# Patient Record
Sex: MALE | Race: WHITE | HISPANIC OR LATINO | Employment: OTHER | URBAN - METROPOLITAN AREA
[De-identification: names, ages, dates, MRNs, and addresses within clinical notes are randomized per-mention and may not be internally consistent; named-entity substitution may affect disease eponyms.]

---

## 2018-01-11 NOTE — PROGRESS NOTES
Assessment    1  Encounter for preventive health examination (V70 0) (Z00 00)   2  Encounter for prostate cancer screening (V76 44) (Z12 5)   3  Overweight (278 02) (E66 3)   4  IFG (impaired fasting glucose) (790 21) (R73 01)   5  Left knee pain (719 46) (M25 562)    Plan  Health Maintenance, Encounter for prostate cancer screening, IFG (impaired fasting  glucose), Overweight    · (1) PSA (SCREEN) (Dx V76 44 Screen for Prostate Cancer); Status:Active; Requested  IAR:[id number];    · (1) CBC/PLT/DIFF; Status:Active; Requested PNA:[id number]; Health Maintenance, IFG (impaired fasting glucose), Overweight    · (1) BASIC METABOLIC PROFILE; Status:Active; Requested GTQ:[id number];    · (1) HEMOGLOBIN A1C; Status:Active; Requested KZF:[id number];    · (1) LIPID PANEL FASTING W DIRECT LDL REFLEX; Status:Active; Requested  SJN:97MMU7752;   Left knee pain    · Meloxicam 7 5 MG Oral Tablet; TAKE 1 TABLET 1-2 times DAILY AS NEEDED for  knee pain   · * XR KNEE 4+ VIEW LEFT; Status:Active; Requested LII:[id number];   Need for Tdap vaccination    · Adacel 5-2-15 5 LF-MCG/0 5 Intramuscular Suspension    Discussion/Summary  Impression: health maintenance visit  Colorectal cancer screening: colorectal cancer screening is current  The immunizations are up to date and immunizations will be given as outlined in the orders  Advice and education were given regarding nutrition and weight loss  Patient discussion: discussed with the patient  RTO in 6 months  Possible side effects of new medications were reviewed with the patient/guardian today  The patient was counseled regarding instructions for management, risk factor reductions, impressions  total time of encounter was 30 minutes       Self Referrals: No      Chief Complaint  Patient here for a  and complains of left leg and left knee pain for over a year  negative for depression   needs Adacel will get today and Zostavax will give info  UTD colonoscopy will get report from  [facility]      History of Present Illness  , Adult Male: The patient is being seen for a health maintenance evaluation  The last health maintenance visit was 2 year(s) ago  General Health: The patient's health since the last visit is described as good  Lifestyle:  He does not have a healthy diet  He has weight concerns  Weight control issues: overweight  He does not exercise regularly  He does not use tobacco  He denies alcohol use  He denies drug use  Screening:   HPI: left knee pain when he walks up the steps  At home he has stairs  walking is fine  Pain 5/10  No locking, no injury  He recently retired  Active Problems    1  Overweight (278 02) (E66 3)   2  Vitamin D deficiency (268 9) (E55 9)    Past Medical History    · History of Colon cancer (153 9) (C18 9)    Surgical History    · History of Partial Colectomy    Family History  Mother    · Family history of Diabetes  Father    · Family history of Alzheimer disease  Brother    · Family history of Thyroid disease    Social History    · Lives with relatives   · Never a smoker   · Occasional alcohol use   · Single    Current Meds   1  Calcium 600 MG Oral Tablet; Therapy: 50VMP9376 to Recorded   2  Fish Oil 1000 MG Oral Capsule; Therapy: 86DAD0835 to Recorded   3  Multi-Vitamins Oral Tablet; Therapy: 18TMD3451 to Recorded   4  Vitamin D 2000 UNIT Oral Capsule; Therapy: 30CSI3038 to Recorded    Allergies    1  No Known Drug Allergies    Vitals   Recorded: 21Jun2016 09:19AM   Temperature 98 2 F   Heart Rate 68   Systolic 207   Diastolic 82   Height 5 ft 2 5 in   Weight 205 lb 8 0 oz   BMI Calculated 36 99   BSA Calculated 1 95   O2 Saturation 98   Pain Scale 5     Physical Exam    Constitutional   General appearance: No acute distress, well appearing and well nourished  Head and Face   Head and face: Normal     Eyes   Conjunctiva and lids: No erythema, swelling or discharge  Pupils and irises: Equal, round, reactive to light  Ears, Nose, Mouth, and Throat   External inspection of ears and nose: Normal     Otoscopic examination: Tympanic membranes translucent with normal light reflex  Canals patent without erythema  Lips, teeth, and gums: Normal, good dentition  Oropharynx: Normal with no erythema, edema, exudate or lesions  Neck   Neck: Supple, symmetric, trachea midline, no masses  Thyroid: Normal, no thyromegaly  Pulmonary   Respiratory effort: No increased work of breathing or signs of respiratory distress  Auscultation of lungs: Clear to auscultation  Cardiovascular   Auscultation of heart: Normal rate and rhythm, normal S1 and S2, no murmurs  Peripheral vascular exam: Normal     Examination of extremities for edema and/or varicosities: Normal     Abdomen   Abdomen: Non-tender, no masses  Lymphatic   Palpation of lymph nodes in neck: No lymphadenopathy  Musculoskeletal   Gait and station: Normal     Inspection/palpation of joints, bones, and muscles: Normal     Neurologic   Cranial nerves: Cranial nerves 2-12 intact  Psychiatric   Mood and affect: Normal        Results/Data  PHQ-2 Adult Depression Screening 21Jun2016 09:22AM User, s     Test Name Result Flag Reference   PHQ-2 Adult Depression Score 0     Over the last two weeks, how often have you been bothered by any of the following problems? Little interest or pleasure in doing things: Not at all - 0  Feeling down, depressed, or hopeless: Not at all - 0   PHQ-2 Adult Depression Screening Negative         Future Appointments    Date/Time Provider Specialty Site   12/22/2016 10:00 AM SHAHBAZ De Leon   Family Medicine [address]     Signatures   Electronically signed by : SHAHBAZ Worrell ; Jun 21 2016 12:35PM EST                       (Author)

## 2018-01-12 NOTE — RESULT NOTES
Message      please notify pt his labs are stable  He does have prediabetes so he needs to be careful with carbohydrates and that includes bread, rice, juices, pasta  thanks     Verified Results  (1) HEMOGLOBIN A1C 48BQR8280 07:28AM Lauren Corona Order Number: [id number]  TW Order Number: [id number]     Test Name Result Flag Reference   HEMOGLOBIN A1C 5 8 %  4 2-6 3   EST  AVG  GLUCOSE 120 mg/dl       (1) LIPID PANEL FASTING W DIRECT LDL REFLEX 22Jun2016 07:28AM Lauren Corona Order Number: [id number]  TW Order Number: [id number] Order Number: [id number]     Test Name Result Flag Reference   CHOLESTEROL 179 mg/dL     LDL CHOLESTEROL CALCULATED 97 mg/dL  0-100   Triglyceride:         Normal              <150 mg/dl       Borderline High    150-199 mg/dl       High               200-499 mg/dl       Very High          >499 mg/dl  Cholesterol:         Desirable        <200 mg/dl      Borderline High  200-239 mg/dl      High             >239 mg/dl  HDL Cholesterol:        High    >59 mg/dL      Low     <41 mg/dL  LDL Cholesterol:        Optimal          <100 mg/dl        Near Optimal     100-129 mg/dl        Above Optimal          Borderline High   130-159 mg/dl          High              160-189 mg/dl          Very High        >189 mg/dl  LDL CALCULATED:    This screening LDL is a calculated result  It does not have the accuracy of the Direct Measured LDL in the monitoring of patients with hyperlipidemia and/or statin therapy  Direct Measure LDL (POL626) must be ordered separately in these patients  TRIGLYCERIDES 111 mg/dL  <=150   Specimen collection should occur prior to N-Acetylcysteine or Metamizole administration due to the potential for falsely depressed results  HDL,DIRECT 60 mg/dL  40-60   Specimen collection should occur prior to Metamizole administration due to the potential for falsely depressed results       (1) BASIC METABOLIC PROFILE 14HMZ8022 07:28AM Evangelina Holloway Order Number: [id number]  TW Order Number: [id number] Order Number: [id number]     Test Name Result Flag Reference   GLUCOSE,RANDM 101 mg/dL     If the patient is fasting, the ADA then defines impaired fasting glucose as > 100 mg/dL and diabetes as > or equal to 123 mg/dL  SODIUM 138 mmol/L  136-145   POTASSIUM 3 9 mmol/L  3 5-5 3   CHLORIDE 104 mmol/L  100-108   CARBON DIOXIDE 29 mmol/L  21-32   ANION GAP (CALC) 5 mmol/L  4-13   BLOOD UREA NITROGEN 12 mg/dL  5-25   CREATININE 0 87 mg/dL  0 60-1 30   Standardized to IDMS reference method   CALCIUM 8 7 mg/dL  8 3-10 1   eGFR Non-African American      >60 0 ml/min/1 73[facility] Disease Education Program recommendations are as follows:  GFR calculation is accurate only with a steady state creatinine  Chronic Kidney disease less than 60 ml/min/1 73 sq  meters  Kidney failure less than 15 ml/min/1 73 sq  meters       (1) PSA (SCREEN) (Dx V76 44 Screen for Prostate Cancer) 21XGH1976 07:28AM Evangelina Holloway Order Number: [id number]  TW Order Number: [id number]     Test Name Result Flag Reference   PROSTATE SPECIFIC ANTIGEN 0 9 ng/mL  0 0-4 0     (1) CBC/PLT/DIFF 90Oen2780 07:28AM Evangelina Holloway Order Number: [id number]  TW Order Number: [id number]     Test Name Result Flag Reference   WBC COUNT 6 17 Thousand/uL  4 31-10 16   RBC COUNT 4 64 Million/uL  3 88-5 62   HEMOGLOBIN 15 1 g/dL  12 0-17 0   HEMATOCRIT 44 0 %  36 5-49 3   MCV 95 fL  82-98   MCH 32 5 pg  26 8-34 3   MCHC 34 3 g/dL  31 4-37 4   RDW 13 2 %  11 6-15 1   MPV 10 9 fL  8 9-12 7   PLATELET COUNT 216 Thousands/uL  149-390   nRBC AUTOMATED 0 /100 WBCs     NEUTROPHILS RELATIVE PERCENT 45 %  43-75   LYMPHOCYTES RELATIVE PERCENT 41 %  14-44   MONOCYTES RELATIVE PERCENT 8 %  4-12   EOSINOPHILS RELATIVE PERCENT 5 %  0-6   BASOPHILS RELATIVE PERCENT 1 %  0-1   NEUTROPHILS ABSOLUTE COUNT 2 75 Thousands/?L  1 85-7 62   LYMPHOCYTES ABSOLUTE COUNT 2 55 Thousands/?L  0 60-4 47   MONOCYTES ABSOLUTE COUNT 0 52 Thousand/?L  0 17-1 22   EOSINOPHILS ABSOLUTE COUNT 0 30 Thousand/?L  0 00-0 61   BASOPHILS ABSOLUTE COUNT 0 04 Thousands/?L  0 00-0 10     * XR KNEE 3 VIEW LEFT 21Jun2016 10:38AM Miguel Ángel Platt Order Number: [id number]     Test Name Result Flag Reference   XR KNEE 3 VW LEFT (Report)     LEFT KNEE     INDICATION: Left knee pain for over one year  No trauma     COMPARISON: None     VIEWS: 3; 3 images     FINDINGS:     There is no acute fracture or dislocation  There is no joint effusion  Minimal superior patellar spurring  No lytic or blastic lesions are seen  Soft tissues are unremarkable  IMPRESSION:     No acute osseous abnormality         Workstation performed: [id number]     Signed by:   Lissy Shah MD   6/22/16       Signatures   Electronically signed by : SHAHBAZ Daniel ; Jun 28 2016  8:52AM EST                       (Author)

## 2018-04-06 ENCOUNTER — HOSPITAL ENCOUNTER (EMERGENCY)
Facility: HOSPITAL | Age: 63
Discharge: HOME | End: 2018-04-06

## 2018-04-06 VITALS
SYSTOLIC BLOOD PRESSURE: 133 MMHG | WEIGHT: 210 LBS | RESPIRATION RATE: 18 BRPM | DIASTOLIC BLOOD PRESSURE: 67 MMHG | TEMPERATURE: 97.9 F | HEIGHT: 64 IN | OXYGEN SATURATION: 97 % | HEART RATE: 74 BPM | BODY MASS INDEX: 35.85 KG/M2

## 2018-04-06 DIAGNOSIS — T44.5X1A ACCIDENTAL INJECTION OF EPINEPHRINE, INITIAL ENCOUNTER: Primary | ICD-10-CM

## 2018-04-06 PROCEDURE — 99282 EMERGENCY DEPT VISIT SF MDM: CPT

## 2018-04-06 NOTE — ED PROVIDER NOTES
History  Chief Complaint   Patient presents with    Infectious Exposure - [place]     Patient was stuck by his grandson's new epi pen on his right index finger  57year old male with no past medical history presents today after being stuck in his right 2nd digit with his grandson's (pediatric) epi pen  Believes that a full dose of medication was injected  Incident occurred 2 hours prior to arrival  Pt called the  who told him to come to the emergency department  Pt denies any complaints at this time  Reports normal sensation and full range of motion of the digit  Denies headache, lightheadedness, chest pain, shortness of breath, abdominal pain, nausea, vomiting, diarrhea  None       History reviewed  No pertinent past medical history  Past Surgical History:   Procedure Laterality Date    COLON SURGERY         History reviewed  No pertinent family history  I have reviewed and agree with the history as documented  Social History   Substance Use Topics    Smoking status: Never Smoker    Smokeless tobacco: Never Used    Alcohol use Yes        Review of Systems   Skin: Positive for pallor  All other systems reviewed and are negative  Physical Exam  ED Triage Vitals [04/06/18 0937]   Temperature Pulse Respirations Blood Pressure SpO2   97 9 °F (36 6 °C) 74 18 133/67 97 %      Temp Source Heart Rate Source Patient Position - Orthostatic VS BP Location FiO2 (%)   Oral Monitor Sitting Left arm --      Pain Score       No Pain           Orthostatic Vital Signs  Vitals:    04/06/18 0937   BP: 133/67   Pulse: 74   Patient Position - Orthostatic VS: Sitting       Physical Exam   Constitutional: He appears well-developed and well-nourished  No distress  HENT:   Head: Normocephalic and atraumatic  Mouth/Throat: Oropharynx is clear and moist    Eyes: Conjunctivae are normal    Neck: Normal range of motion  Cardiovascular: Normal rate, regular rhythm and normal heart sounds  Pulmonary/Chest: Effort normal and breath sounds normal  No respiratory distress  He has no wheezes  Abdominal: Soft  He exhibits no distension  There is no tenderness  Musculoskeletal: Normal range of motion  Neurological: He is alert  No sensory deficit  Skin: Skin is warm and dry  He is not diaphoretic  There is pallor (right distal 2nd digit  Warm, sensation intact  No cyanosis  )  Psychiatric: He has a normal mood and affect  His behavior is normal        ED Medications  Medications - No data to display    Diagnostic Studies  Results Reviewed     None                 No orders to display              Procedures  Procedures       Phone Contacts  ED Phone Contact    ED Course  ED Course                                MDM  Number of Diagnoses or Management Options  Accidental injection of epinephrine, initial encounter:   Diagnosis management comments: Pt's finger starting to pink up after 1 hr observation  Finger is warm and does not appear dusky  Sensation is intact  Return precautions reviewed with patient and family member  CritCare Time    Disposition  Final diagnoses:   Accidental injection of epinephrine, initial encounter     Time reflects when diagnosis was documented in both MDM as applicable and the Disposition within this note     Time User Action Codes Description Comment    4/6/2018 11:05 AM Young, [address] [facility] Accidental injection of epinephrine, initial encounter       ED Disposition     ED Disposition Condition Comment    Discharge  Noel Mcdowell discharge to home/self care  Condition at discharge: Good        Follow-up Information     Follow up With Specialties Details Why Sivakumar Morrison MD Family Medicine Schedule an appointment as soon as possible for a visit  [address]  [address] [address] [place] [address]  Lucero Mitchell [address] [place] [address]  [phone]          There are no discharge medications for this patient  No discharge procedures on file      ED Provider  Electronically Signed by           Estrella Candelaria PA-C  04/06/18 1151

## 2019-03-05 ENCOUNTER — LAB REQUISITION (OUTPATIENT)
Dept: LAB | Facility: HOSPITAL | Age: 64
End: 2019-03-05
Payer: MEDICARE

## 2019-03-05 DIAGNOSIS — Z85.038 PERSONAL HISTORY OF OTHER MALIGNANT NEOPLASM OF LARGE INTESTINE: ICD-10-CM

## 2019-03-05 PROCEDURE — 88305 TISSUE EXAM BY PATHOLOGIST: CPT | Performed by: PATHOLOGY
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
## 2019-07-05 ENCOUNTER — OFFICE VISIT (OUTPATIENT)
Dept: FAMILY MEDICINE CLINIC | Facility: CLINIC | Age: 64
End: 2019-07-05
Payer: MEDICARE

## 2019-07-05 VITALS
OXYGEN SATURATION: 96 % | WEIGHT: 199.2 LBS | HEIGHT: 62 IN | RESPIRATION RATE: 18 BRPM | DIASTOLIC BLOOD PRESSURE: 80 MMHG | BODY MASS INDEX: 36.66 KG/M2 | TEMPERATURE: 98.3 F | HEART RATE: 76 BPM | SYSTOLIC BLOOD PRESSURE: 120 MMHG

## 2019-07-05 DIAGNOSIS — Z77.29 CARBON MONOXIDE EXPOSURE: Primary | ICD-10-CM

## 2019-07-05 DIAGNOSIS — Z00.00 ENCOUNTER FOR HEALTH MAINTENANCE EXAMINATION IN ADULT: ICD-10-CM

## 2019-07-05 DIAGNOSIS — Z23 NEED FOR VACCINATION AGAINST STREPTOCOCCUS PNEUMONIAE USING PNEUMOCOCCAL CONJUGATE VACCINE 13: ICD-10-CM

## 2019-07-05 PROCEDURE — 99204 OFFICE O/P NEW MOD 45 MIN: CPT | Performed by: FAMILY MEDICINE

## 2019-07-05 PROCEDURE — 90670 PCV13 VACCINE IM: CPT

## 2019-07-05 PROCEDURE — G0009 ADMIN PNEUMOCOCCAL VACCINE: HCPCS

## 2019-07-05 NOTE — ASSESSMENT & PLAN NOTE
Labwork ordered to assess    If labwork is normal, will plan to follow-up in one year for an annual physical

## 2019-07-05 NOTE — PATIENT INSTRUCTIONS

## 2019-07-06 ENCOUNTER — APPOINTMENT (OUTPATIENT)
Dept: LAB | Facility: HOSPITAL | Age: 64
End: 2019-07-06
Payer: MEDICARE

## 2019-07-06 DIAGNOSIS — Z00.00 ENCOUNTER FOR HEALTH MAINTENANCE EXAMINATION IN ADULT: ICD-10-CM

## 2019-07-06 LAB
ALBUMIN SERPL BCP-MCNC: 4.2 G/DL (ref 3.5–5)
ALP SERPL-CCNC: 64 U/L (ref 46–116)
ALT SERPL W P-5'-P-CCNC: 18 U/L (ref 12–78)
ANION GAP SERPL CALCULATED.3IONS-SCNC: 8 MMOL/L (ref 4–13)
AST SERPL W P-5'-P-CCNC: 24 U/L (ref 5–45)
BASOPHILS # BLD AUTO: 0.06 THOUSANDS/ΜL (ref 0–0.1)
BASOPHILS NFR BLD AUTO: 1 % (ref 0–1)
BILIRUB SERPL-MCNC: 1.24 MG/DL (ref 0.2–1)
BUN SERPL-MCNC: 14 MG/DL (ref 5–25)
CALCIUM SERPL-MCNC: 9 MG/DL (ref 8.3–10.1)
CHLORIDE SERPL-SCNC: 106 MMOL/L (ref 100–108)
CHOLEST SERPL-MCNC: 176 MG/DL (ref 50–200)
CO2 SERPL-SCNC: 27 MMOL/L (ref 21–32)
CREAT SERPL-MCNC: 0.88 MG/DL (ref 0.6–1.3)
EOSINOPHIL # BLD AUTO: 0.21 THOUSAND/ΜL (ref 0–0.61)
EOSINOPHIL NFR BLD AUTO: 2 % (ref 0–6)
ERYTHROCYTE [DISTWIDTH] IN BLOOD BY AUTOMATED COUNT: 13.3 % (ref 11.6–15.1)
EST. AVERAGE GLUCOSE BLD GHB EST-MCNC: 111 MG/DL
GFR SERPL CREATININE-BSD FRML MDRD: 90 ML/MIN/1.73SQ M
GLUCOSE P FAST SERPL-MCNC: 76 MG/DL (ref 65–99)
HBA1C MFR BLD: 5.5 % (ref 4.2–6.3)
HCT VFR BLD AUTO: 45.4 % (ref 36.5–49.3)
HCV AB SER QL: NORMAL
HDLC SERPL-MCNC: 62 MG/DL (ref 40–60)
HGB BLD-MCNC: 15.2 G/DL (ref 12–17)
IMM GRANULOCYTES # BLD AUTO: 0.02 THOUSAND/UL (ref 0–0.2)
IMM GRANULOCYTES NFR BLD AUTO: 0 % (ref 0–2)
LDLC SERPL CALC-MCNC: 100 MG/DL (ref 0–100)
LYMPHOCYTES # BLD AUTO: 2.43 THOUSANDS/ΜL (ref 0.6–4.47)
LYMPHOCYTES NFR BLD AUTO: 28 % (ref 14–44)
MCH RBC QN AUTO: 32.3 PG (ref 26.8–34.3)
MCHC RBC AUTO-ENTMCNC: 33.5 G/DL (ref 31.4–37.4)
MCV RBC AUTO: 96 FL (ref 82–98)
MONOCYTES # BLD AUTO: 0.64 THOUSAND/ΜL (ref 0.17–1.22)
MONOCYTES NFR BLD AUTO: 7 % (ref 4–12)
NEUTROPHILS # BLD AUTO: 5.48 THOUSANDS/ΜL (ref 1.85–7.62)
NEUTS SEG NFR BLD AUTO: 62 % (ref 43–75)
NRBC BLD AUTO-RTO: 0 /100 WBCS
PLATELET # BLD AUTO: 222 THOUSANDS/UL (ref 149–390)
PMV BLD AUTO: 11.2 FL (ref 8.9–12.7)
POTASSIUM SERPL-SCNC: 3.9 MMOL/L (ref 3.5–5.3)
PROT SERPL-MCNC: 8 G/DL (ref 6.4–8.2)
RBC # BLD AUTO: 4.71 MILLION/UL (ref 3.88–5.62)
SODIUM SERPL-SCNC: 141 MMOL/L (ref 136–145)
TRIGL SERPL-MCNC: 71 MG/DL
TSH SERPL DL<=0.05 MIU/L-ACNC: 1.88 UIU/ML (ref 0.36–3.74)
WBC # BLD AUTO: 8.84 THOUSAND/UL (ref 4.31–10.16)

## 2019-07-06 PROCEDURE — 84443 ASSAY THYROID STIM HORMONE: CPT

## 2019-07-06 PROCEDURE — 86803 HEPATITIS C AB TEST: CPT

## 2019-07-06 PROCEDURE — 36415 COLL VENOUS BLD VENIPUNCTURE: CPT

## 2019-07-06 PROCEDURE — 83036 HEMOGLOBIN GLYCOSYLATED A1C: CPT

## 2019-07-06 PROCEDURE — G0103 PSA SCREENING: HCPCS

## 2019-07-06 PROCEDURE — 80053 COMPREHEN METABOLIC PANEL: CPT

## 2019-07-06 PROCEDURE — 80061 LIPID PANEL: CPT

## 2019-07-06 PROCEDURE — 85025 COMPLETE CBC W/AUTO DIFF WBC: CPT

## 2019-07-09 LAB
PSA FREE MFR SERPL: 12.5 %
PSA FREE SERPL-MCNC: 0.1 NG/ML
PSA SERPL-MCNC: 0.8 NG/ML (ref 0–4)

## 2019-07-11 ENCOUNTER — TELEPHONE (OUTPATIENT)
Dept: FAMILY MEDICINE CLINIC | Facility: CLINIC | Age: 64
End: 2019-07-11

## 2019-07-11 PROBLEM — Z77.29 CARBON MONOXIDE EXPOSURE: Status: ACTIVE | Noted: 2019-07-11

## 2019-07-11 NOTE — ASSESSMENT & PLAN NOTE
Patient has oxygen saturation of 96% with normal lung sounds and no cyanosis on exam   No further treatment indicated at this time

## 2019-09-30 ENCOUNTER — TELEPHONE (OUTPATIENT)
Dept: FAMILY MEDICINE CLINIC | Facility: CLINIC | Age: 64
End: 2019-09-30

## 2020-08-04 ENCOUNTER — OFFICE VISIT (OUTPATIENT)
Dept: FAMILY MEDICINE CLINIC | Facility: CLINIC | Age: 65
End: 2020-08-04
Payer: MEDICARE

## 2020-08-04 VITALS
TEMPERATURE: 98.1 F | DIASTOLIC BLOOD PRESSURE: 60 MMHG | RESPIRATION RATE: 17 BRPM | HEIGHT: 64 IN | OXYGEN SATURATION: 98 % | WEIGHT: 224 LBS | HEART RATE: 72 BPM | SYSTOLIC BLOOD PRESSURE: 128 MMHG | BODY MASS INDEX: 38.24 KG/M2

## 2020-08-04 DIAGNOSIS — Z23 NEED FOR PROPHYLACTIC VACCINATION AGAINST STREPTOCOCCUS PNEUMONIAE (PNEUMOCOCCUS): ICD-10-CM

## 2020-08-04 DIAGNOSIS — Z00.00 WELCOME TO MEDICARE PREVENTIVE VISIT: Primary | ICD-10-CM

## 2020-08-04 DIAGNOSIS — Z13.21 SCREENING FOR ENDOCRINE, NUTRITIONAL, METABOLIC AND IMMUNITY DISORDER: ICD-10-CM

## 2020-08-04 DIAGNOSIS — Z13.29 SCREENING FOR ENDOCRINE, NUTRITIONAL, METABOLIC AND IMMUNITY DISORDER: ICD-10-CM

## 2020-08-04 DIAGNOSIS — Z13.228 SCREENING FOR ENDOCRINE, NUTRITIONAL, METABOLIC AND IMMUNITY DISORDER: ICD-10-CM

## 2020-08-04 DIAGNOSIS — H54.61 VISION LOSS OF RIGHT EYE: ICD-10-CM

## 2020-08-04 DIAGNOSIS — Z13.0 SCREENING FOR ENDOCRINE, NUTRITIONAL, METABOLIC AND IMMUNITY DISORDER: ICD-10-CM

## 2020-08-04 PROCEDURE — 1170F FXNL STATUS ASSESSED: CPT | Performed by: FAMILY MEDICINE

## 2020-08-04 PROCEDURE — 90732 PPSV23 VACC 2 YRS+ SUBQ/IM: CPT | Performed by: FAMILY MEDICINE

## 2020-08-04 PROCEDURE — G0009 ADMIN PNEUMOCOCCAL VACCINE: HCPCS | Performed by: FAMILY MEDICINE

## 2020-08-04 PROCEDURE — 1123F ACP DISCUSS/DSCN MKR DOCD: CPT | Performed by: FAMILY MEDICINE

## 2020-08-04 PROCEDURE — 3008F BODY MASS INDEX DOCD: CPT | Performed by: FAMILY MEDICINE

## 2020-08-04 PROCEDURE — 4040F PNEUMOC VAC/ADMIN/RCVD: CPT | Performed by: FAMILY MEDICINE

## 2020-08-04 PROCEDURE — 1160F RVW MEDS BY RX/DR IN RCRD: CPT | Performed by: FAMILY MEDICINE

## 2020-08-04 PROCEDURE — 1125F AMNT PAIN NOTED PAIN PRSNT: CPT | Performed by: FAMILY MEDICINE

## 2020-08-04 PROCEDURE — G0438 PPPS, INITIAL VISIT: HCPCS | Performed by: FAMILY MEDICINE

## 2020-08-04 PROCEDURE — G0403 EKG FOR INITIAL PREVENT EXAM: HCPCS | Performed by: FAMILY MEDICINE

## 2020-08-04 NOTE — PATIENT INSTRUCTIONS

## 2020-08-04 NOTE — PROGRESS NOTES
Assessment and Plan:     Problem List Items Addressed This Visit        Other    Vision loss of right eye     Patient reports decreased vision in right eye s/p eye surgery 10 years ago  Will refer to optometry for vision evaluation  Relevant Orders    Ambulatory referral to Optometry      Other Visit Diagnoses     Welcome to Medicare preventive visit    -  Primary    Screening for endocrine, nutritional, metabolic and immunity disorder        Relevant Orders    Lipid Panel with Direct LDL reflex    Comprehensive metabolic panel    CBC and Platelet    TSH, 3rd generation with Free T4 reflex    Need for prophylactic vaccination against Streptococcus pneumoniae (pneumococcus)        Relevant Orders    PNEUMOCOCCAL POLYSACCHARIDE VACCINE 23-VALENT =>1YO SQ IM (Completed)        BMI Counseling: Body mass index is 38 45 kg/m²  The BMI is above normal  Nutrition recommendations include encouraging healthy choices of fruits and vegetables, limiting drinks that contain sugar and moderation in carbohydrate intake  Exercise recommendations include exercising 3-5 times per week  Preventive health issues were discussed with patient, and age appropriate screening tests were ordered as noted in patient's After Visit Summary  Personalized health advice and appropriate referrals for health education or preventive services given if needed, as noted in patient's After Visit Summary  History of Present Illness:     Patient presents for Welcome to Medicare visit  Patient Care Team:  Namita Egan MD as PCP - General (Family Medicine)  Santos Smith MD     Review of Systems:     Review of Systems   Eyes: Positive for visual disturbance (decreased vision right eye s/p eye surgery 10 years ago)  Respiratory: Negative for shortness of breath  Cardiovascular: Negative for chest pain  All other systems reviewed and are negative       Problem List:     Patient Active Problem List   Diagnosis    Encounter for health maintenance examination in adult    Carbon monoxide exposure    Vision loss of right eye      Past Medical and Surgical History:     Past Medical History:   Diagnosis Date    Colon cancer [facility])      Past Surgical History:   Procedure Laterality Date    COLON SURGERY        Family History:     Family History   Problem Relation Age of Onset    Diabetes Mother     Diabetes insipidus Mother     Alzheimer's disease Father     Thyroid disease Brother     Hypertension Son       Social History:        Social History     Socioeconomic History    Marital status: /Civil Union     Spouse name: None    Number of children: None    Years of education: None    Highest education level: None   Occupational History    Occupation: Retired    Social Needs    Financial resource strain: Not hard at all   [address] insecurity     Worry: Never true     Inability: Never true    Transportation needs     Medical: No     Non-medical: No   Tobacco Use    Smoking status: Never Smoker    Smokeless tobacco: Never Used   Substance and Sexual Activity    Alcohol use:  Yes    Drug use: No    Sexual activity: Not Currently   Lifestyle    Physical activity     Days per week: 0 days     Minutes per session: 0 min    Stress: None   Relationships    Social connections     Talks on phone: Patient refused     Gets together: Patient refused     Attends Yazidism service: Patient refused     Active member of club or organization: Patient refused     Attends meetings of clubs or organizations: Patient refused     Relationship status: Patient refused    Intimate partner violence     Fear of current or ex partner: Patient refused     Emotionally abused: Patient refused     Physically abused: Patient refused     Forced sexual activity: Patient refused   Other Topics Concern    None   Social History Narrative    Has a living will     Lives with daughter       Medications and Allergies:     No current outpatient medications on file  No current facility-administered medications for this visit  Allergies   Allergen Reactions    Shellfish-Derived Products       Immunizations:     Immunization History   Administered Date(s) Administered    Pneumococcal Conjugate 13-Valent 07/05/2019    Pneumococcal Polysaccharide PPV23 08/04/2020    Tdap 06/21/2016      Health Maintenance:         Topic Date Due    Hepatitis C Screening  Completed         Topic Date Due    Influenza Vaccine  07/01/2020    Pneumococcal Vaccine: 65+ Years (2 of 2 - PPSV23) 07/05/2020      Medicare Screening Tests and Risk Assessments:     Mitesh Toledo is here for his Welcome to Medicare visit  Health Risk Assessment:   Patient rates overall health as good  Patient feels that their physical health rating is same  Eyesight was rated as same  Hearing was rated as same  Patient feels that their emotional and mental health rating is same  Pain experienced in the last 7 days has been none  Patient states that he has experienced no weight loss or gain in last 6 months  Depression Screening:   PHQ-2 Score: 0      Fall Risk Screening: In the past year, patient has experienced: no history of falling in past year      Home Safety:  Patient does not have trouble with stairs inside or outside of their home  Patient has working smoke alarms and has working carbon monoxide detector  Home safety hazards include: none  Nutrition:   Current diet is Regular  Medications:   Patient is not currently taking any over-the-counter supplements  Patient is able to manage medications  Activities of Daily Living (ADLs)/Instrumental Activities of Daily Living (IADLs):   Walk and transfer into and out of bed and chair?: Yes  Dress and groom yourself?: Yes    Bathe or shower yourself?: Yes    Feed yourself?  Yes  Do your laundry/housekeeping?: Yes  Manage your money, pay your bills and track your expenses?: Yes  Make your own meals?: Yes    Do your own shopping?: Yes    Previous Hospitalizations:   Any hospitalizations or ED visits within the last 12 months?: No      Advance Care Planning:   Living will: Yes    Advanced directive: Yes    Five wishes given: No      Cognitive Screening:   Provider or family/friend/caregiver concerned regarding cognition?: No    PREVENTIVE SCREENINGS      Cardiovascular Screening:    General: Screening Current      Colorectal Cancer Screening:     General: History Colorectal Cancer      Osteoporosis Screening:    General: Screening Not Indicated      Abdominal Aortic Aneurysm (AAA) Screening:    Risk factors include: age between 73-67 yo        General: Screening Not Indicated      Lung Cancer Screening:     General: Screening Not Indicated      Hepatitis C Screening:    General: Screening Current     Visual Acuity Screening    Right eye Left eye Both eyes   Without correction: 20/200 20/25 20/25   With correction:           Physical Exam:     /60 (BP Location: Left arm, Patient Position: Sitting, Cuff Size: Standard)   Pulse 72   Temp 98 1 °F (36 7 °C) (Oral)   Resp 17   Ht 5' 4"   Wt 102 kg (224 lb)   SpO2 98%   BMI 38 45 kg/m²     Physical Exam   Constitutional: He is oriented to person, place, and time  He is cooperative  HENT:   Head: Normocephalic and atraumatic  Right Ear: Hearing, tympanic membrane, external ear and ear canal normal    Left Ear: Hearing, tympanic membrane, external ear and ear canal normal    Mouth/Throat: Uvula is midline  Mucous membranes are moist  Oropharynx is clear  Eyes: Pupils are equal, round, and reactive to light  Conjunctivae and lids are normal  Right eye exhibits no discharge  Left eye exhibits no discharge  Neck: Trachea normal and normal range of motion  Neck supple  No thyromegaly present  Cardiovascular: Normal rate, regular rhythm and normal heart sounds  No murmur heard  Pulmonary/Chest: Effort normal and breath sounds normal  He has no wheezes  He has no rhonchi   He has no rales  Abdominal: Soft  Bowel sounds are normal  There is no abdominal tenderness  Musculoskeletal: Normal range of motion  Lymphadenopathy:        Head (right side): No submandibular adenopathy present  Head (left side): No submandibular adenopathy present  Right cervical: No superficial cervical adenopathy present  Left cervical: No superficial cervical adenopathy present  Neurological: He is alert and oriented to person, place, and time  Gait normal    Skin: Skin is warm and dry  Psychiatric: His speech is normal and behavior is normal  Mood normal    Vitals reviewed  EKG: normal EKG, normal sinus rhythm, there are no previous tracings available for comparison        Earnestine Escamilla MD

## 2020-08-04 NOTE — ASSESSMENT & PLAN NOTE
Patient reports decreased vision in right eye s/p eye surgery 10 years ago  Will refer to optometry for vision evaluation

## 2020-08-05 ENCOUNTER — APPOINTMENT (OUTPATIENT)
Dept: LAB | Facility: HOSPITAL | Age: 65
End: 2020-08-05
Payer: MEDICARE

## 2020-08-05 DIAGNOSIS — Z13.0 SCREENING FOR ENDOCRINE, NUTRITIONAL, METABOLIC AND IMMUNITY DISORDER: ICD-10-CM

## 2020-08-05 DIAGNOSIS — Z13.228 SCREENING FOR ENDOCRINE, NUTRITIONAL, METABOLIC AND IMMUNITY DISORDER: ICD-10-CM

## 2020-08-05 DIAGNOSIS — Z13.29 SCREENING FOR ENDOCRINE, NUTRITIONAL, METABOLIC AND IMMUNITY DISORDER: ICD-10-CM

## 2020-08-05 DIAGNOSIS — Z13.21 SCREENING FOR ENDOCRINE, NUTRITIONAL, METABOLIC AND IMMUNITY DISORDER: ICD-10-CM

## 2020-08-05 LAB
ALBUMIN SERPL BCP-MCNC: 3.6 G/DL (ref 3.5–5)
ALP SERPL-CCNC: 63 U/L (ref 46–116)
ALT SERPL W P-5'-P-CCNC: 29 U/L (ref 12–78)
ANION GAP SERPL CALCULATED.3IONS-SCNC: 5 MMOL/L (ref 4–13)
AST SERPL W P-5'-P-CCNC: 20 U/L (ref 5–45)
BILIRUB SERPL-MCNC: 0.97 MG/DL (ref 0.2–1)
BUN SERPL-MCNC: 11 MG/DL (ref 5–25)
CALCIUM SERPL-MCNC: 9.1 MG/DL (ref 8.3–10.1)
CHLORIDE SERPL-SCNC: 108 MMOL/L (ref 100–108)
CHOLEST SERPL-MCNC: 161 MG/DL (ref 50–200)
CO2 SERPL-SCNC: 26 MMOL/L (ref 21–32)
CREAT SERPL-MCNC: 0.88 MG/DL (ref 0.6–1.3)
ERYTHROCYTE [DISTWIDTH] IN BLOOD BY AUTOMATED COUNT: 13.1 % (ref 11.6–15.1)
GFR SERPL CREATININE-BSD FRML MDRD: 90 ML/MIN/1.73SQ M
GLUCOSE P FAST SERPL-MCNC: 115 MG/DL (ref 65–99)
HCT VFR BLD AUTO: 43.7 % (ref 36.5–49.3)
HDLC SERPL-MCNC: 57 MG/DL
HGB BLD-MCNC: 14.7 G/DL (ref 12–17)
LDLC SERPL CALC-MCNC: 87 MG/DL (ref 0–100)
MCH RBC QN AUTO: 32.8 PG (ref 26.8–34.3)
MCHC RBC AUTO-ENTMCNC: 33.6 G/DL (ref 31.4–37.4)
MCV RBC AUTO: 98 FL (ref 82–98)
PLATELET # BLD AUTO: 231 THOUSANDS/UL (ref 149–390)
PMV BLD AUTO: 10.4 FL (ref 8.9–12.7)
POTASSIUM SERPL-SCNC: 3.9 MMOL/L (ref 3.5–5.3)
PROT SERPL-MCNC: 7.4 G/DL (ref 6.4–8.2)
RBC # BLD AUTO: 4.48 MILLION/UL (ref 3.88–5.62)
SODIUM SERPL-SCNC: 139 MMOL/L (ref 136–145)
TRIGL SERPL-MCNC: 85 MG/DL
TSH SERPL DL<=0.05 MIU/L-ACNC: 2.8 UIU/ML (ref 0.36–3.74)
WBC # BLD AUTO: 7.08 THOUSAND/UL (ref 4.31–10.16)

## 2020-08-05 PROCEDURE — 80061 LIPID PANEL: CPT

## 2020-08-05 PROCEDURE — 80053 COMPREHEN METABOLIC PANEL: CPT

## 2020-08-05 PROCEDURE — 36415 COLL VENOUS BLD VENIPUNCTURE: CPT

## 2020-08-05 PROCEDURE — 85027 COMPLETE CBC AUTOMATED: CPT

## 2020-08-05 PROCEDURE — 84443 ASSAY THYROID STIM HORMONE: CPT

## 2021-03-28 ENCOUNTER — IMMUNIZATIONS (OUTPATIENT)
Dept: FAMILY MEDICINE CLINIC | Facility: HOSPITAL | Age: 66
End: 2021-03-28

## 2021-03-28 DIAGNOSIS — Z23 ENCOUNTER FOR IMMUNIZATION: Primary | ICD-10-CM

## 2021-03-28 PROCEDURE — 0001A SARS-COV-2 / COVID-19 MRNA VACCINE (PFIZER-BIONTECH) 30 MCG: CPT

## 2021-03-28 PROCEDURE — 91300 SARS-COV-2 / COVID-19 MRNA VACCINE (PFIZER-BIONTECH) 30 MCG: CPT

## 2021-04-18 ENCOUNTER — IMMUNIZATIONS (OUTPATIENT)
Dept: FAMILY MEDICINE CLINIC | Facility: HOSPITAL | Age: 66
End: 2021-04-18

## 2021-04-18 DIAGNOSIS — Z23 ENCOUNTER FOR IMMUNIZATION: Primary | ICD-10-CM

## 2021-04-18 PROCEDURE — 0002A SARS-COV-2 / COVID-19 MRNA VACCINE (PFIZER-BIONTECH) 30 MCG: CPT

## 2021-04-18 PROCEDURE — 91300 SARS-COV-2 / COVID-19 MRNA VACCINE (PFIZER-BIONTECH) 30 MCG: CPT

## 2021-08-04 ENCOUNTER — OFFICE VISIT (OUTPATIENT)
Dept: FAMILY MEDICINE CLINIC | Facility: CLINIC | Age: 66
End: 2021-08-04
Payer: MEDICARE

## 2021-08-04 ENCOUNTER — LAB (OUTPATIENT)
Dept: LAB | Facility: HOSPITAL | Age: 66
End: 2021-08-04
Payer: MEDICARE

## 2021-08-04 VITALS
DIASTOLIC BLOOD PRESSURE: 80 MMHG | TEMPERATURE: 98.2 F | HEART RATE: 60 BPM | RESPIRATION RATE: 18 BRPM | SYSTOLIC BLOOD PRESSURE: 130 MMHG | WEIGHT: 211 LBS | HEIGHT: 64 IN | BODY MASS INDEX: 36.02 KG/M2

## 2021-08-04 DIAGNOSIS — Z13.1 SCREENING FOR DIABETES MELLITUS (DM): ICD-10-CM

## 2021-08-04 DIAGNOSIS — C18.7 MALIGNANT NEOPLASM OF SIGMOID COLON (HCC): ICD-10-CM

## 2021-08-04 DIAGNOSIS — R73.01 IMPAIRED FASTING BLOOD SUGAR: ICD-10-CM

## 2021-08-04 DIAGNOSIS — Z00.00 ENCOUNTER FOR ANNUAL WELLNESS EXAM IN MEDICARE PATIENT: Primary | ICD-10-CM

## 2021-08-04 LAB
ANION GAP SERPL CALCULATED.3IONS-SCNC: 4 MMOL/L (ref 4–13)
BUN SERPL-MCNC: 14 MG/DL (ref 5–25)
CALCIUM SERPL-MCNC: 9.1 MG/DL (ref 8.3–10.1)
CHLORIDE SERPL-SCNC: 105 MMOL/L (ref 100–108)
CO2 SERPL-SCNC: 29 MMOL/L (ref 21–32)
CREAT SERPL-MCNC: 0.87 MG/DL (ref 0.6–1.3)
EST. AVERAGE GLUCOSE BLD GHB EST-MCNC: 111 MG/DL
GFR SERPL CREATININE-BSD FRML MDRD: 89 ML/MIN/1.73SQ M
GLUCOSE P FAST SERPL-MCNC: 97 MG/DL (ref 65–99)
HBA1C MFR BLD: 5.5 %
POTASSIUM SERPL-SCNC: 4.2 MMOL/L (ref 3.5–5.3)
SODIUM SERPL-SCNC: 138 MMOL/L (ref 136–145)

## 2021-08-04 PROCEDURE — 1123F ACP DISCUSS/DSCN MKR DOCD: CPT | Performed by: FAMILY MEDICINE

## 2021-08-04 PROCEDURE — G0439 PPPS, SUBSEQ VISIT: HCPCS | Performed by: FAMILY MEDICINE

## 2021-08-04 PROCEDURE — 99213 OFFICE O/P EST LOW 20 MIN: CPT | Performed by: FAMILY MEDICINE

## 2021-08-04 PROCEDURE — 83036 HEMOGLOBIN GLYCOSYLATED A1C: CPT

## 2021-08-04 PROCEDURE — 36415 COLL VENOUS BLD VENIPUNCTURE: CPT

## 2021-08-04 PROCEDURE — 80048 BASIC METABOLIC PNL TOTAL CA: CPT

## 2021-08-04 NOTE — PROGRESS NOTES
BMI Counseling: Body mass index is 36 22 kg/m²  The BMI is above normal  Nutrition recommendations include decreasing portion sizes, encouraging healthy choices of fruits and vegetables and limiting drinks that contain sugar  Exercise recommendations include moderate physical activity 150 minutes/week  Assessment/Plan:    Encounter for annual wellness exam in Medicare patient  Patient declines prostate screening testing  Discussed the risks and benefits today  He is a nonsmoker and does not qualify for AAA screening or lung cancer screening  The colonoscopy is up-to-date  He has a history of colon cancer and his last colonoscopy was about 3 years ago  Cholesterol is well controlled, however ASCVD risk elevated at 10 4 due to age  Discussed risks and benefits of starting a statin  Pt declines at this time  He would not be interested in taking a medication for 5-10 years  Malignant neoplasm of sigmoid colon ([facility] [address] )  Adeno carcinoma of the sigmoid colon in 2003  Patient had a left hemicolectomy with adjuvant chemotherapy  Pt goes to GI, Dr Dahlia Drew  The last colonoscopy was done March 2019 and 1 tubular adenoma was removed  Diagnoses and all orders for this visit:    Encounter for annual wellness exam in Medicare patient    [facility]    Impaired fasting blood sugar  -     Hemoglobin A1C; Future    Screening for diabetes mellitus (DM)  -     Basic metabolic panel; Future          Subjective:  Chronic conditions     Patient ID: Shoshana Junior is a 77 y o  male with a history of colon cancer    HPI  Patient denies complaints today        The following portions of the patient's history were reviewed and updated as appropriate: allergies, current medications, past family history, past medical history, past social history, past surgical history and problem list     Review of Systems   Constitutional: Negative for activity change, appetite change, fever and unexpected weight change  HENT: Negative for ear pain, postnasal drip and rhinorrhea  Eyes: Negative for photophobia and pain  Respiratory: Negative for cough, shortness of breath and wheezing  Cardiovascular: Negative for chest pain, palpitations and leg swelling  Gastrointestinal: Negative for abdominal pain, blood in stool, nausea and vomiting  Endocrine: Negative for polydipsia and polyuria  Genitourinary: Negative for difficulty urinating, hematuria and urgency  Musculoskeletal: Negative for myalgias  Skin: Negative for rash  Neurological: Negative for dizziness  Psychiatric/Behavioral: Negative for confusion and sleep disturbance  PHQ-9 Depression Screening    PHQ-9:   Frequency of the following problems over the past two weeks:      Little interest or pleasure in doing things: 0 - not at all  Feeling down, depressed, or hopeless: 0 - not at all  PHQ-2 Score: 0           Objective:      /80 (BP Location: Left arm, Patient Position: Sitting, Cuff Size: Adult)   Pulse 60   Temp 98 2 °F (36 8 °C) (Tympanic)   Resp 18   Ht 5' 4" (1 626 m)   Wt 95 7 kg (211 lb)   BMI 36 22 kg/m²          Physical Exam  Constitutional:       Appearance: He is well-developed  He is obese  HENT:      Head: Normocephalic and atraumatic  Right Ear: External ear normal       Left Ear: External ear normal       Mouth/Throat:      Pharynx: No oropharyngeal exudate  Eyes:      Conjunctiva/sclera: Conjunctivae normal       Pupils: Pupils are equal, round, and reactive to light  Cardiovascular:      Rate and Rhythm: Normal rate and regular rhythm  Heart sounds: Normal heart sounds  No murmur heard  No friction rub  No gallop  Pulmonary:      Effort: Pulmonary effort is normal  No respiratory distress  Breath sounds: Normal breath sounds  No wheezing or rales  Chest:      Chest wall: No tenderness  Abdominal:      General: Bowel sounds are normal  There is no distension        Palpations: Abdomen is soft  There is no mass  Tenderness: There is no abdominal tenderness  There is no rebound  Musculoskeletal:         General: Normal range of motion  Cervical back: Normal range of motion and neck supple  Skin:     General: Skin is warm and dry  Neurological:      Mental Status: He is alert and oriented to person, place, and time

## 2021-08-04 NOTE — ASSESSMENT & PLAN NOTE
Adeno carcinoma of the sigmoid colon in 2003  Patient had a left hemicolectomy with adjuvant chemotherapy  Pt goes to GI, Dr Kimberly Brown  The last colonoscopy was done March 2019 and 1 tubular adenoma was removed

## 2021-08-04 NOTE — PROGRESS NOTES
Assessment and Plan:     Problem List Items Addressed This Visit        Digestive    Malignant neoplasm of sigmoid colon ([address] )     Adeno carcinoma of the sigmoid colon in 2003  Patient had a left hemicolectomy with adjuvant chemotherapy  Pt goes to GI, Dr Wang Alexander  The last colonoscopy was done March 2019 and 1 tubular adenoma was removed  Other    Encounter for annual wellness exam in Medicare patient - Primary     Patient declines prostate screening testing  Discussed the risks and benefits today  He is a nonsmoker and does not qualify for AAA screening or lung cancer screening  The colonoscopy is up-to-date  He has a history of colon cancer and his last colonoscopy was about 3 years ago  Cholesterol is well controlled, however ASCVD risk elevated at 12 4 due to age  Discussed risks and benefits of starting a statin  Pt declines at this time  He would not be interested in taking a medication for 5-10 years  Other Visit Diagnoses     Impaired fasting blood sugar        Relevant Orders    Hemoglobin A1C    Screening for diabetes mellitus (DM)        Relevant Orders    Basic metabolic panel           Preventive health issues were discussed with patient, and age appropriate screening tests were ordered as noted in patient's After Visit Summary  Personalized health advice and appropriate referrals for health education or preventive services given if needed, as noted in patient's After Visit Summary       History of Present Illness:     Patient presents for Medicare Annual Wellness visit    Patient Care Team:  Erica Varner MD as PCP - General (Family Medicine)  Erna Workman MD     Problem List:     Patient Active Problem List   Diagnosis    Encounter for annual wellness exam in Medicare patient    Carbon monoxide exposure    Vision loss of right eye    Malignant neoplasm of sigmoid colon [facility])      Past Medical and Surgical History:     Past Medical History:   Diagnosis Date  Colon cancer [facility])      Past Surgical History:   Procedure Laterality Date    COLON SURGERY        Family History:     Family History   Problem Relation Age of Onset   Mollie Sizer Diabetes Mother     Diabetes insipidus Mother     Alzheimer's disease Father     Thyroid disease Brother     Hypertension Son       Social History:     Social History     Socioeconomic History    Marital status: /Civil Union     Spouse name: None    Number of children: None    Years of education: None    Highest education level: None   Occupational History    Occupation: Retired    Tobacco Use    Smoking status: Never Smoker    Smokeless tobacco: Never Used   Vaping Use    Vaping Use: Never used   Substance and Sexual Activity    Alcohol use: Yes    Drug use: No    Sexual activity: Not Currently   Other Topics Concern    None   Social History Narrative    Has a living will     Lives with daughter      Social Determinants of Health     Financial Resource Strain: Low Risk     Difficulty of Paying Living Expenses: Not hard at all   Food Insecurity: No Food Insecurity    Worried About Running Out of Food in the Last Year: Never true    Prosper of Food in the Last Year: Never true   Transportation Needs: No Transportation Needs    Lack of Transportation (Medical): No    Lack of Transportation (Non-Medical):  No   Physical Activity: Inactive    Days of Exercise per Week: 0 days    Minutes of Exercise per Session: 0 min   Stress:     Feeling of Stress :    Social Connections: Unknown    Frequency of Communication with Friends and Family: Patient refused    Frequency of Social Gatherings with Friends and Family: Patient refused    Attends Jehovah's witness Services: Patient refused    Active Member of Clubs or Organizations: Patient refused    Attends Club or Organization Meetings: Patient refused    Marital Status: Patient refused   Intimate Partner Violence: Unknown    Fear of Current or Ex-Partner: Patient refused    Emotionally Abused: Patient refused    Physically Abused: Patient refused    Sexually Abused: Patient refused      Medications and Allergies:     No current outpatient medications on file  No current facility-administered medications for this visit  Allergies   Allergen Reactions    Shellfish-Derived Products - Food Allergy       Immunizations:     Immunization History   Administered Date(s) Administered    Pneumococcal Conjugate 13-Valent 07/05/2019    Pneumococcal Polysaccharide PPV23 08/04/2020    SARS-CoV-2 / COVID-19 mRNA IM (Pfizer-[employer]) 03/28/2021, 04/18/2021    Tdap 06/21/2016      Health Maintenance:         Topic Date Due    Hepatitis C Screening  Completed         Topic Date Due    Influenza Vaccine (1) 09/01/2021      Medicare Health Risk Assessment:     /80 (BP Location: Left arm, Patient Position: Sitting, Cuff Size: Adult)   Pulse 60   Temp 98 2 °F (36 8 °C) (Tympanic)   Resp 18   Ht 5' 4" (1 626 m)   Wt 95 7 kg (211 lb)   BMI 36 22 kg/m²      Kevin Garcia is here for his Subsequent Wellness visit  Last Medicare Wellness visit information reviewed, patient interviewed and updates made to the record today  Health Risk Assessment:   Patient rates overall health as good  Patient feels that their physical health rating is same  Patient is very satisfied with their life  Eyesight was rated as same  Hearing was rated as same  Patient feels that their emotional and mental health rating is much better  Patients states they are never, rarely angry  Patient states they are never, rarely unusually tired/fatigued  Pain experienced in the last 7 days has been none  Patient states that he has experienced no weight loss or gain in last 6 months  Depression Screening:   PHQ-2 Score: 0      Fall Risk Screening: In the past year, patient has experienced: no history of falling in past year      Home Safety:  Patient does not have trouble with stairs inside or outside of their home  Patient has working smoke alarms and has working carbon monoxide detector  Home safety hazards include: none  Nutrition:   Current diet is Regular  Medications:   Patient is not currently taking any over-the-counter supplements  Patient is able to manage medications  Activities of Daily Living (ADLs)/Instrumental Activities of Daily Living (IADLs):   Walk and transfer into and out of bed and chair?: Yes  Dress and groom yourself?: Yes    Bathe or shower yourself?: Yes    Feed yourself? Yes  Do your laundry/housekeeping?: Yes  Manage your money, pay your bills and track your expenses?: Yes  Make your own meals?: Yes    Do your own shopping?: Yes    Advance Care Planning:   Living will: Yes    Advanced directive: Yes    Five wishes given: No      Cognitive Screening:   Provider or family/friend/caregiver concerned regarding cognition?: No    PREVENTIVE SCREENINGS      Cardiovascular Screening:    General: Screening Current      Diabetes Screening:     General: Screening Current      Colorectal Cancer Screening:     General: History Colorectal Cancer and Screening Current      Prostate Cancer Screening:    General: Patient Declines      Osteoporosis Screening:    General: Screening Not Indicated      Abdominal Aortic Aneurysm (AAA) Screening:    Risk factors include: age between 73-67 yo        General: Screening Not Indicated      Lung Cancer Screening:     General: Screening Not Indicated      Hepatitis C Screening:    General: Screening Current    Screening, Brief Intervention, and Referral to Treatment (SBIRT)    Screening  Typical number of drinks in a day: 0  Typical number of drinks in a week: 0  Interpretation: Low risk drinking behavior  AUDIT-C Screenin) How often did you have a drink containing alcohol in the past year? 2 to 3 times a week  2) How many drinks did you have on a typical day when you were drinking in the past year?  10 or more  3) How often did you have 6 or more drinks on one occasion in the past year? weekly    AUDIT-C Score: 6  Interpretation: Score 4-12 (male): POSITIVE screen for alcohol misuse    AUDIT Screenin) How often during the last year have you found that you were not able to stop drinking once you had started? 3 - weekly  5) How often during the last year have you failed to do what was normally expected from you because of drinking? 0 - never  6) How often during the last year have you needed a first drink in the morning to get yourself going after a heavy drinking session? 0 - never  7) How often during the last year have you had a feeling of guilt or remorse after drinking? 0 - never  8) How often during the last year have you been unable to remember what happened the night before because you had been drinking? 0 - never  9) Have you or someone else been injured as a result of your drinking? 0 - no  10) Has a relative or friend or a doctor or another health worker been concerned about your drinking or suggested you cut down? 0 - no    AUDIT Score: 9  Interpretation: Harmful or hazardous alcohol consumption    Single Item Drug Screening:  How often have you used an illegal drug (including marijuana) or a prescription medication for non-medical reasons in the past year? never    Single Item Drug Screen Score: 0  Interpretation: Negative screen for possible drug use disorder    Brief Intervention  Alcohol & drug use screenings were reviewed  No concerns regarding substance use disorder identified  Other Counseling Topics:   Car/seat belt/driving safety and calcium and vitamin D intake and regular weightbearing exercise         Pa Shah MD

## 2021-08-04 NOTE — ASSESSMENT & PLAN NOTE
Patient declines prostate screening testing  Discussed the risks and benefits today  He is a nonsmoker and does not qualify for AAA screening or lung cancer screening  The colonoscopy is up-to-date  He has a history of colon cancer and his last colonoscopy was about 3 years ago  Cholesterol is well controlled, however ASCVD risk elevated at 12 4 due to age  Discussed risks and benefits of starting a statin  Pt declines at this time  He would not be interested in taking a medication for 5-10 years

## 2022-01-20 ENCOUNTER — OFFICE VISIT (OUTPATIENT)
Dept: FAMILY MEDICINE CLINIC | Facility: CLINIC | Age: 67
End: 2022-01-20
Payer: MEDICARE

## 2022-01-20 VITALS
HEART RATE: 80 BPM | TEMPERATURE: 98.2 F | BODY MASS INDEX: 38.76 KG/M2 | SYSTOLIC BLOOD PRESSURE: 126 MMHG | WEIGHT: 227 LBS | HEIGHT: 64 IN | OXYGEN SATURATION: 98 % | DIASTOLIC BLOOD PRESSURE: 74 MMHG | RESPIRATION RATE: 17 BRPM

## 2022-01-20 DIAGNOSIS — L85.3 DRY SKIN: ICD-10-CM

## 2022-01-20 DIAGNOSIS — R60.9 SWELLING: ICD-10-CM

## 2022-01-20 DIAGNOSIS — B35.1 ONYCHOMYCOSIS: Primary | ICD-10-CM

## 2022-01-20 PROCEDURE — 99213 OFFICE O/P EST LOW 20 MIN: CPT | Performed by: FAMILY MEDICINE

## 2022-01-20 NOTE — ASSESSMENT & PLAN NOTE
Discussed with patient regarding oral anti fungal medication  Liver functions reviewed  Normal     Refer to podiatry for further evaluation and treatment    Follow up as needed

## 2022-01-20 NOTE — ASSESSMENT & PLAN NOTE
Skin changes may be due to dry skin  Advised patient that he can become darker  Advised patient to apply lotion after shower  Wear socks to keep moisture    Follow-up as needed

## 2022-01-20 NOTE — ASSESSMENT & PLAN NOTE
No swelling noted today  Advised patient that he may use compression stockings as needed for any swelling leg  Previous blood work reviewed  No electrolyte abnormalities  This may be time dependent  Distal pulses intact      Follow-up as needed

## 2022-01-20 NOTE — PATIENT INSTRUCTIONS
You have toenail fungus  Please schedule appointment with podiatrist for treatment  You have dry skin in your legs  Please applying will lotion especially after you shower or bathe to lock in the moisture  Then put on socks to retain moisture  You can buy a compression stockings if you have any leg swelling  This may be 1 of the reasons why he have skin changes/or skin discoloration

## 2022-01-20 NOTE — PROGRESS NOTES
Assessment/Plan:    Swelling  No swelling noted today  Advised patient that he may use compression stockings as needed for any swelling leg  Previous blood work reviewed  No electrolyte abnormalities  This may be time dependent  Distal pulses intact  Follow-up as needed    Dry skin  Skin changes may be due to dry skin  Advised patient that he can become darker  Advised patient to apply lotion after shower  Wear socks to keep moisture  Follow-up as needed    Onychomycosis  Discussed with patient regarding oral anti fungal medication  Liver functions reviewed  Normal     Refer to podiatry for further evaluation and treatment  Follow up as needed       Diagnoses and all orders for this visit:    Onychomycosis  -     Ambulatory Referral to Podiatry; Future    Dry skin    Swelling          Subjective:   No chief complaint on file  Health Maintenance   Topic Date Due    Influenza Vaccine (1) Never done    COVID-19 Vaccine (3 - Booster for Buitrago Peter series) 10/18/2021    Fall Risk  08/04/2022    Depression Screening  08/04/2022    Medicare Annual Wellness Visit (AWV)  08/04/2022    BMI: Followup Plan  08/04/2022    BMI: Adult  01/20/2023    DTaP,Tdap,and Td Vaccines (2 - Td or Tdap) 06/21/2026    Hepatitis C Screening  Completed    Pneumococcal Vaccine: 65+ Years  Completed    HIB Vaccine  Aged Out    Hepatitis B Vaccine  Aged Out    IPV Vaccine  Aged Out    Hepatitis A Vaccine  Aged Out    Meningococcal ACWY Vaccine  Aged Out    HPV Vaccine  Aged Out        Patient ID: Salvatore Rangel is a 74 y o  male  HPI    Patient c/o lower leg skin changes over the past few months  Occur before but now more noticeable  Concern of circulation problem  Itches at times  Does not use lotion  Has right big toe nail fungus  Wants treatment for this  No otc medication  Occasional has swelling of legs  Today no swelling   No history of blood pressure or circulation problems in the past     The following portions of the patient's history were reviewed and updated as appropriate: allergies, current medications, past family history, past medical history, past social history, past surgical history, and problem list     Review of Systems   Constitutional: Negative for chills and fever  HENT: Negative for congestion  Eyes: Negative for visual disturbance  Respiratory: Negative for cough and shortness of breath  Cardiovascular: Negative for chest pain and palpitations  Gastrointestinal: Negative for nausea and vomiting  Genitourinary: Negative for dysuria  Musculoskeletal: Negative for myalgias  Skin:        Dry and itchy skin   Neurological: Negative for dizziness and headaches  Objective:  /74 (BP Location: Left arm, Patient Position: Sitting, Cuff Size: Adult)   Pulse 80   Temp 98 2 °F (36 8 °C) (Tympanic)   Resp 17   Ht 5' 4" (1 626 m)   Wt 103 kg (227 lb)   SpO2 98%   BMI 38 96 kg/m²      Physical Exam  Vitals and nursing note reviewed  Constitutional:       General: He is not in acute distress  HENT:      Head: Normocephalic and atraumatic  Eyes:      Conjunctiva/sclera: Conjunctivae normal    Cardiovascular:      Rate and Rhythm: Normal rate and regular rhythm  Pulses: Normal pulses  Heart sounds: No murmur heard  Comments: DP and PT pulses intact bilaterally  Pulmonary:      Effort: Pulmonary effort is normal  No respiratory distress  Breath sounds: Normal breath sounds  No wheezing  Skin:     Comments: Dry and somewhat darker skin noted in both lower legs  No swelling  Nontender or pruritic at this time  Neurological:      Mental Status: He is alert  Psychiatric:         Mood and Affect: Mood normal            This note has been constructed using a voice recognition system  There may be translation, syntax, or grammatical errors  If you have an questions, please contact the dictating provider